# Patient Record
Sex: MALE | Race: BLACK OR AFRICAN AMERICAN | Employment: FULL TIME | URBAN - METROPOLITAN AREA
[De-identification: names, ages, dates, MRNs, and addresses within clinical notes are randomized per-mention and may not be internally consistent; named-entity substitution may affect disease eponyms.]

---

## 2019-12-23 ENCOUNTER — HOSPITAL ENCOUNTER (EMERGENCY)
Age: 19
Discharge: HOME | End: 2019-12-24
Attending: EMERGENCY MEDICINE
Payer: OTHER GOVERNMENT

## 2019-12-23 DIAGNOSIS — T78.2XXA ANAPHYLAXIS, INITIAL ENCOUNTER: Primary | ICD-10-CM

## 2019-12-23 PROCEDURE — 74011000250 HC RX REV CODE- 250: Performed by: EMERGENCY MEDICINE

## 2019-12-23 PROCEDURE — 99285 EMERGENCY DEPT VISIT HI MDM: CPT

## 2019-12-23 PROCEDURE — 74011250636 HC RX REV CODE- 250/636: Performed by: EMERGENCY MEDICINE

## 2019-12-23 PROCEDURE — 96374 THER/PROPH/DIAG INJ IV PUSH: CPT

## 2019-12-23 RX ORDER — FAMOTIDINE 10 MG/ML
40 INJECTION INTRAVENOUS
Status: DISCONTINUED | OUTPATIENT
Start: 2019-12-23 | End: 2019-12-23

## 2019-12-23 RX ADMIN — SODIUM CHLORIDE 1000 ML: 900 INJECTION, SOLUTION INTRAVENOUS at 23:58

## 2019-12-23 RX ADMIN — FAMOTIDINE 40 MG: 10 INJECTION, SOLUTION INTRAVENOUS at 22:34

## 2019-12-23 NOTE — LETTER
NOTIFICATION RETURN TO WORK / SCHOOL 
 
12/24/2019 12:26 AM 
 
Mr. Majo Hernandez. [address] [place] [id number] To Whom It May Concern: Majo Hernandez. is currently under the care of [facility] EMERGENCY DEPT. He will return to work/school on: 12/26/19 If there are questions or concerns please have the patient contact our office. Sincerely, Priscila Jean-Baptiste, DO

## 2019-12-24 VITALS
TEMPERATURE: 98 F | WEIGHT: 195 LBS | DIASTOLIC BLOOD PRESSURE: 52 MMHG | RESPIRATION RATE: 18 BRPM | SYSTOLIC BLOOD PRESSURE: 121 MMHG | HEART RATE: 70 BPM | OXYGEN SATURATION: 100 %

## 2019-12-24 PROCEDURE — 96361 HYDRATE IV INFUSION ADD-ON: CPT

## 2019-12-24 RX ORDER — PREDNISONE 50 MG/1
50 TABLET ORAL DAILY
Qty: 3 TAB | Refills: 0 | Status: SHIPPED | OUTPATIENT
Start: 2019-12-24 | End: 2019-12-27

## 2019-12-24 RX ORDER — PREDNISONE 50 MG/1
50 TABLET ORAL DAILY
Qty: 3 TAB | Refills: 0 | Status: SHIPPED | OUTPATIENT
Start: 2019-12-24 | End: 2019-12-24

## 2019-12-24 RX ORDER — EPINEPHRINE 0.3 MG/.3ML
0.3 INJECTION SUBCUTANEOUS
Qty: 2 SYRINGE | Refills: 1 | Status: SHIPPED | OUTPATIENT
Start: 2019-12-24 | End: 2019-12-24

## 2019-12-24 NOTE — DISCHARGE INSTRUCTIONS
Patient Education        Anaphylactic Reaction: Care Instructions  Your Care Instructions    A bad allergic reaction affects your whole body. Doctors call it an anaphylactic reaction. Your immune system may have reacted to food or medicine. Or maybe you had an insect bite or sting. This kind of reaction can happen the first time you come into contact with a substance. Or it may take many times before a substance causes a problem. You need to get help right away if your body reacts like this again. Follow-up care is a key part of your treatment and safety. Be sure to make and go to all appointments, and call your doctor if you are having problems. It's also a good idea to know your test results and keep a list of the medicines you take. How can you care for yourself at home? · If your doctor has prescribed medicine, such as an antihistamine, take it exactly as directed. Call your doctor if you think you are having a problem with your medicine. · Learn all you can about your allergies. You may be able to avoid a severe response when you do or don't do certain things. For instance, you can check food or drug labels for contents that might cause problems. · Your doctor may prescribe a shot of epinephrine to carry with you in case you have a severe reaction. Learn how to give yourself the shot. Keep it with you at all times. Make sure it has not . · Wear medical alert jewelry that lists your allergies. You can buy this at most drugstores. · Teach your family and friends about your allergies. Tell them what you need to avoid. Teach them what to do if you have a reaction. · Before you take any medicine, tell your doctor if you have had a bad response to any medicines in the past.  When should you call for help?   Give an epinephrine shot if:    · You think you are having a severe allergic reaction.    After giving an epinephrine shot call 911, even if you feel better.   Call 911 if:    · You have symptoms of a severe allergic reaction. These may include:  ? Sudden raised, red areas (hives) all over your body. ? Swelling of the throat, mouth, lips, or tongue. ? Trouble breathing. ? Passing out (losing consciousness). Or you may feel very lightheaded or suddenly feel weak, confused, or restless.     · You have been given an epinephrine shot, even if you feel better.    Call your doctor now or seek immediate medical care if:    · You have symptoms of an allergic reaction, such as:  ? A rash or hives (raised, red areas on the skin). ? Itching. ? Swelling. ? Belly pain, nausea, or vomiting.    Watch closely for changes in your health, and be sure to contact your doctor if:    · You do not get better as expected. Where can you learn more? Go to http://mabel-enrrique.info/. Enter E186 in the search box to learn more about \"Anaphylactic Reaction: Care Instructions. \"  Current as of: April 7, 2019  Content Version: 12.2  © 5162-8818 [employer]. Care instructions adapted under license by [employer] (which disclaims liability or warranty for this information). If you have questions about a medical condition or this instruction, always ask your healthcare professional. [address] any warranty or liability for your use of this information.

## 2019-12-24 NOTE — ED PROVIDER NOTES
EMERGENCY DEPARTMENT HISTORY AND PHYSICAL EXAM    9:30 PM      Date: 12/23/2019  Patient Name: Raina Aguirre. History of Presenting Illness     Chief Complaint   Patient presents with    Allergic Reaction         History Provided By: Patient and EMS      Additional History (Context): Raina Marcano is a 20 y.o. male with asthma who presents with acute facial swelling. Patient states he has a known allergy to peanuts and, immediately PTA, he ate a cookie that unknowingly had peanuts in it. He reports shortness of breath. Patient took 2 doses of children's benadryl at home. [facility] and [facility] and solumedrol. EMS gave patient benadryl and albuterol. He states he is feeling better after medications. He denies ETOH and tobacco use. No other concerns or symptoms at this time. PCP: Armando Key, Not On File    Chief Complaint: Facial Swelling  Duration:  immediately PTA  Timing:  Acute  Location: face  Quality: N/A  Severity: N/A  Modifying Factors: after eating cookie with peanuts. 2 doses of children's benadryl at home, solumedrol and epi at [facility], albuterol and benadryl via EMS with relief  Associated Symptoms: shortness of breath        Past History     Past Medical History:  Past Medical History:   Diagnosis Date    Asthma        Past Surgical History:  Past Surgical History:   Procedure Laterality Date    HX TONSILLECTOMY         Family History:  None    Social History:  Social History     Tobacco Use    Smoking status: Never Smoker   Substance Use Topics    Alcohol use: Not on file    Drug use: Not on file       Allergies: Allergies   Allergen Reactions    Amoxicillin Rash    Peanut Anaphylaxis    Tree Nut Rash    Penicillins Hives and Shortness of Breath         Review of Systems       Review of Systems   Constitutional: Negative for activity change, fatigue and fever. HENT: Positive for facial swelling. Negative for congestion and rhinorrhea.     Eyes: Negative for visual disturbance. Respiratory: Positive for shortness of breath. Cardiovascular: Negative for chest pain and palpitations. Gastrointestinal: Negative for abdominal pain, diarrhea, nausea and vomiting. Genitourinary: Negative for dysuria and hematuria. Musculoskeletal: Negative for back pain. Skin: Negative for rash. Neurological: Negative for dizziness, weakness and light-headedness. All other systems reviewed and are negative. Physical Exam     Visit Vitals  /46   Pulse 69   Temp 98 °F (36.7 °C)   Resp 19   Wt 88.5 kg (195 lb)   SpO2 100%         Physical Exam  Vitals signs and nursing note reviewed. Constitutional:       General: He is not in acute distress. Appearance: He is well-developed. HENT:      Head: Normocephalic and atraumatic. Right Ear: External ear normal.      Left Ear: External ear normal.      Nose: Nose normal.      Mouth/Throat:      Comments: Mild uvular swelling. No swelling of tongue or lips. Eyes:      Conjunctiva/sclera: Conjunctivae normal.      Pupils: Pupils are equal, round, and reactive to light. Comments: Right periorbital edema. Neck:      Musculoskeletal: Normal range of motion and neck supple. Trachea: No tracheal deviation. Cardiovascular:      Rate and Rhythm: Normal rate and regular rhythm. Pulmonary:      Effort: Pulmonary effort is normal.      Breath sounds: Normal breath sounds. No stridor. No wheezing. Comments: Lungs clear. Chest:      Chest wall: No tenderness. Abdominal:      General: Bowel sounds are normal. There is no distension. Palpations: Abdomen is soft. Tenderness: There is no tenderness. There is no guarding or rebound. Musculoskeletal: Normal range of motion. General: No tenderness. Skin:     General: Skin is warm and dry. Neurological:      Mental Status: He is alert and oriented to person, place, and time. Cranial Nerves: No cranial nerve deficit. Coordination: Coordination normal.   Psychiatric:         Behavior: Behavior normal.         Thought Content: Thought content normal.         Judgment: Judgment normal.           Diagnostic Study Results     Labs -  No results found for this or any previous visit (from the past 12 hour(s)). Radiologic Studies -   No orders to display         Medical Decision Making     It should be noted that IMabel DO will be the provider of record for this patient. I reviewed the vital signs, available nursing notes, past medical history, past surgical history, family history and social history. Vital Signs-Reviewed the patient's vital signs. Pulse Oximetry Analysis -  100% on room air , nml    Cardiac Monitor:  Rate: 86 BPM      Records Reviewed: Nursing Notes and Old Medical Records (Time of Review: 9:30 PM)    Orders Placed This Encounter    DISCONTD: famotidine (PF) (PEPCID) injection 40 mg    famotidine (PF) (PEPCID) 40 mg in 0.9% sodium chloride 10 mL injection    sodium chloride 0.9 % bolus infusion 1,000 mL    EPINEPHrine (EPIPEN) 0.3 mg/0.3 mL injection    predniSONE (DELTASONE) 50 mg tablet       ED Course: Progress Notes, Reevaluation, and Consults:  10:23 PM  Resting comfortably    11:21 PM  Patient states that he is feeling better. Patient swelling decreased. However, patient is having hypotension. Will receive fluid bolus. 12:28 AM  Patient appears well with no further symptoms. Ambulates easily. Will be discharged home to follow-up with his doctor    Provider Notes (Medical Decision Making):   Patient is a 21-year-old male with a history of peanut allergy who mistakenly ate something with peanut in it tonight and then had an anaphylactic reaction. Patient received epinephrine, Solu-Medrol, and Benadryl prior to arrival.  Currently, symptoms have nearly resolved as well. He will be discharged home with prescription for prednisone and an EpiPen.   He is to carry this with him at all times. Understands and agrees with plan. Stable for discharge. Return if further concerns. Diagnosis     Clinical Impression:   1. Anaphylaxis, initial encounter        Disposition: Discharged    Follow-up Information     Follow up With Specialties Details Why [address] [place] [address]  Schedule an appointment as soon as possible for a visit As needed Jeet. [address]  [address]  [phone]           Patient's Medications   Start Taking    EPINEPHRINE (EPIPEN) 0.3 MG/0.3 ML INJECTION    0.3 mL by IntraMUSCular route once as needed for Anaphylaxis for up to 1 dose. PREDNISONE (DELTASONE) 50 MG TABLET    Take 1 Tab by mouth daily for 3 days. Continue Taking    No medications on file   These Medications have changed    No medications on file   Stop Taking    No medications on file     _______________________________       Chica Lua acting as a scribe for and in the presence of Max Navarrete DO      December 24, 2019 at 12:29 AM       Provider Attestation:      I personally performed the services described in the documentation, reviewed the documentation, as recorded by the scribe in my presence, and it accurately and completely records my words and actions.  December 24, 2019 at 12:29 AM - Max WYNN DO       _______________________________

## 2019-12-24 NOTE — ED TRIAGE NOTES
Patient ate cookie at work. Unaware that cookie had peanuts. Pt has peanut allergy. [facility]  Swelling to eyes face and lips. No respiratory distress noted.

## 2019-12-24 NOTE — ED NOTES
Pt ate cookies containing peanut butter by accident,did not have epipen, became SOB with swelling to face and throat, itching, pt [facility], solumedrol and benadryl in [place] [facility] [place] [place] [facility], pt to receive pepcid here in ED